# Patient Record
Sex: MALE | Employment: OTHER | URBAN - METROPOLITAN AREA
[De-identification: names, ages, dates, MRNs, and addresses within clinical notes are randomized per-mention and may not be internally consistent; named-entity substitution may affect disease eponyms.]

---

## 2019-04-24 PROCEDURE — 88300 SURGICAL PATH GROSS: CPT | Performed by: OTOLARYNGOLOGY

## 2019-04-24 PROCEDURE — 88304 TISSUE EXAM BY PATHOLOGIST: CPT | Performed by: OTOLARYNGOLOGY

## 2020-02-03 PROBLEM — Z83.3 FAMILY HISTORY OF DIABETES MELLITUS: Status: ACTIVE | Noted: 2020-02-03

## 2020-02-03 PROBLEM — Z13.220 SCREENING, LIPID: Status: ACTIVE | Noted: 2020-02-03

## 2020-12-14 PROBLEM — M84.374A STRESS FRACTURE OF NAVICULAR BONE OF RIGHT FOOT: Status: ACTIVE | Noted: 2018-07-10

## 2020-12-14 PROBLEM — M76.61 ACHILLES TENDINITIS OF RIGHT LOWER EXTREMITY: Status: ACTIVE | Noted: 2018-07-10

## 2021-05-15 PROBLEM — M84.374A STRESS FRACTURE OF NAVICULAR BONE OF RIGHT FOOT: Status: RESOLVED | Noted: 2018-07-10 | Resolved: 2021-05-15

## 2021-05-15 PROBLEM — M76.61 ACHILLES TENDINITIS OF RIGHT LOWER EXTREMITY: Status: RESOLVED | Noted: 2018-07-10 | Resolved: 2021-05-15

## 2021-05-15 PROBLEM — Z83.3 FAMILY HISTORY OF DIABETES MELLITUS: Status: RESOLVED | Noted: 2020-02-03 | Resolved: 2021-05-15

## 2023-04-19 ENCOUNTER — HOSPITAL ENCOUNTER (EMERGENCY)
Facility: HOSPITAL | Age: 28
Discharge: HOME | End: 2023-04-19
Attending: STUDENT IN AN ORGANIZED HEALTH CARE EDUCATION/TRAINING PROGRAM
Payer: COMMERCIAL

## 2023-04-19 VITALS
RESPIRATION RATE: 18 BRPM | TEMPERATURE: 97 F | HEIGHT: 67 IN | BODY MASS INDEX: 28.25 KG/M2 | OXYGEN SATURATION: 99 % | WEIGHT: 180 LBS | SYSTOLIC BLOOD PRESSURE: 132 MMHG | DIASTOLIC BLOOD PRESSURE: 84 MMHG | HEART RATE: 81 BPM

## 2023-04-19 DIAGNOSIS — S81.812A LACERATION OF LEFT LOWER EXTREMITY, INITIAL ENCOUNTER: Primary | ICD-10-CM

## 2023-04-19 PROCEDURE — 99283 EMERGENCY DEPT VISIT LOW MDM: CPT

## 2023-04-19 PROCEDURE — 12002 RPR S/N/AX/GEN/TRNK2.6-7.5CM: CPT

## 2023-04-19 RX ORDER — DEXTROAMPHETAMINE SACCHARATE, AMPHETAMINE ASPARTATE, DEXTROAMPHETAMINE SULFATE AND AMPHETAMINE SULFATE 5; 5; 5; 5 MG/1; MG/1; MG/1; MG/1
20 TABLET ORAL 2 TIMES DAILY
COMMUNITY

## 2023-04-20 NOTE — DISCHARGE INSTRUCTIONS
Please return to the emergency department or to your primary care doctor in 10 days to have  your sutures removed. Return to the emergency department earlier if you develop fevers, if you  see pus coming from the wound, or if you develop redness around the wound that extends  beyond 1 inch from the wound edges as these can be signs of wound infection.

## 2023-04-20 NOTE — ED INITIAL ASSESSMENT (HPI)
Pt presents to ED for a lac to the right shin after getting it sliced by an ice skate. Pt states he is UTD on tetanus. Bleeding controlled with pressure dressing.

## 2023-04-29 ENCOUNTER — OFFICE VISIT (OUTPATIENT)
Dept: FAMILY MEDICINE CLINIC | Facility: CLINIC | Age: 28
End: 2023-04-29
Payer: COMMERCIAL

## 2023-04-29 VITALS
BODY MASS INDEX: 27.97 KG/M2 | DIASTOLIC BLOOD PRESSURE: 76 MMHG | TEMPERATURE: 98 F | RESPIRATION RATE: 20 BRPM | WEIGHT: 178.19 LBS | HEART RATE: 65 BPM | OXYGEN SATURATION: 97 % | HEIGHT: 67 IN | SYSTOLIC BLOOD PRESSURE: 134 MMHG

## 2023-04-29 DIAGNOSIS — Z48.02 VISIT FOR SUTURE REMOVAL: Primary | ICD-10-CM

## 2023-04-29 PROCEDURE — 3075F SYST BP GE 130 - 139MM HG: CPT | Performed by: NURSE PRACTITIONER

## 2023-04-29 PROCEDURE — 3008F BODY MASS INDEX DOCD: CPT | Performed by: NURSE PRACTITIONER

## 2023-04-29 PROCEDURE — 3078F DIAST BP <80 MM HG: CPT | Performed by: NURSE PRACTITIONER
